# Patient Record
Sex: MALE | Race: WHITE
[De-identification: names, ages, dates, MRNs, and addresses within clinical notes are randomized per-mention and may not be internally consistent; named-entity substitution may affect disease eponyms.]

---

## 2019-01-18 ENCOUNTER — HOSPITAL ENCOUNTER (OUTPATIENT)
Dept: HOSPITAL 57 - BURULT | Age: 54
Discharge: HOME | End: 2019-01-18
Attending: FAMILY MEDICINE
Payer: COMMERCIAL

## 2019-01-18 DIAGNOSIS — R10.84: Primary | ICD-10-CM

## 2019-01-18 DIAGNOSIS — R93.3: ICD-10-CM

## 2019-01-18 PROCEDURE — 74177 CT ABD & PELVIS W/CONTRAST: CPT

## 2019-01-18 NOTE — CT
CT ABDOMEN AND PELVIS WITH CONTRAST

1/18/19

 

Spiral CT of the abdomen and pelvis was done for evaluation of generalized abdominal pain. Axial slic
es were acquired after giving oral and IV contrast. Then coronal and sagittal reconstructions were do
ne.

 

The lung bases are clear. 

 

The liver, spleen, pancreas, gallbladder, adrenal glands, kidneys, and abdominal aorta all shows no a
cute findings. Some calcification is seen in the lower abdominal aorta, but there is no aneurysm. 

 

The major finding on this study is very definite, prominent bowel wall thickening in the duodenum and
 proximal small bowel. It remains this way through about the mid small bowel then assumes a more norm
al appearance distally. Regarding the colon, there is some prominence of its folds but none that trul
y appear acute. There is no sign of diverticulitis. There was no genaro-intestinal stranding. No free a
ir or free fluid was seen. 

 

CT of the pelvis showed no masses, inflammatory changes in fat or free fluid. Small fat filled inguin
al hernias are seen bilaterally, largest on the right. 

 

IMPRESSION:  

Very prominent thickening of duodenum and proximal small bowel. Enteritis from infectious or inflamma
tory etiology seems most likely.

 

Code T

 

POS: HOME

## 2019-02-18 ENCOUNTER — HOSPITAL ENCOUNTER (OUTPATIENT)
Dept: HOSPITAL 92 - RAD | Age: 54
Discharge: HOME | End: 2019-02-18
Attending: INTERNAL MEDICINE
Payer: COMMERCIAL

## 2019-02-18 DIAGNOSIS — R06.00: Primary | ICD-10-CM

## 2019-02-18 DIAGNOSIS — I51.7: ICD-10-CM

## 2019-02-18 PROCEDURE — 82533 TOTAL CORTISOL: CPT

## 2019-02-18 PROCEDURE — 82607 VITAMIN B-12: CPT

## 2019-02-18 PROCEDURE — 36415 COLL VENOUS BLD VENIPUNCTURE: CPT

## 2019-02-18 PROCEDURE — 84403 ASSAY OF TOTAL TESTOSTERONE: CPT

## 2019-02-18 PROCEDURE — 71046 X-RAY EXAM CHEST 2 VIEWS: CPT

## 2019-02-18 NOTE — RAD
TWO VIEWS CHEST:

2/18/19

 

HISTORY: 

Dyspnea. 

 

PA and lateral views of the chest is obtained on 2/18/19.

 

COMPARISON:  

Comparison made to previous exam from 12/19/18.

 

Two views chest demonstrates mild cardiomegaly. Pulmonary vascular congestion seen. No evidence of ef
fusions, pneumonia or pneumothorax seen.

 

IMPRESSION:  

Mild cardiomegaly, otherwise unremarkable two views chest. 

 

POS: [facility]

## 2019-05-14 ENCOUNTER — HOSPITAL ENCOUNTER (OUTPATIENT)
Dept: HOSPITAL 92 - ERS | Age: 54
Setting detail: OBSERVATION
LOS: 1 days | Discharge: HOME | End: 2019-05-15
Attending: FAMILY MEDICINE | Admitting: FAMILY MEDICINE
Payer: COMMERCIAL

## 2019-05-14 VITALS — BODY MASS INDEX: 33.7 KG/M2

## 2019-05-14 DIAGNOSIS — I12.9: ICD-10-CM

## 2019-05-14 DIAGNOSIS — F32.9: ICD-10-CM

## 2019-05-14 DIAGNOSIS — E87.5: Primary | ICD-10-CM

## 2019-05-14 DIAGNOSIS — E78.5: ICD-10-CM

## 2019-05-14 DIAGNOSIS — F10.11: ICD-10-CM

## 2019-05-14 DIAGNOSIS — N18.3: ICD-10-CM

## 2019-05-14 DIAGNOSIS — N17.9: ICD-10-CM

## 2019-05-14 DIAGNOSIS — Z87.891: ICD-10-CM

## 2019-05-14 DIAGNOSIS — D63.1: ICD-10-CM

## 2019-05-14 DIAGNOSIS — F41.9: ICD-10-CM

## 2019-05-14 DIAGNOSIS — E87.1: ICD-10-CM

## 2019-05-14 DIAGNOSIS — Z79.52: ICD-10-CM

## 2019-05-14 DIAGNOSIS — E66.9: ICD-10-CM

## 2019-05-14 DIAGNOSIS — E87.2: ICD-10-CM

## 2019-05-14 DIAGNOSIS — Z79.899: ICD-10-CM

## 2019-05-14 DIAGNOSIS — K21.9: ICD-10-CM

## 2019-05-14 LAB
ALBUMIN SERPL BCG-MCNC: 4 G/DL (ref 3.5–5)
ALP SERPL-CCNC: 238 U/L (ref 40–150)
ALT SERPL W P-5'-P-CCNC: 40 U/L (ref 8–55)
ANION GAP SERPL CALC-SCNC: 13 MMOL/L (ref 10–20)
AST SERPL-CCNC: 34 U/L (ref 5–34)
BASOPHILS # BLD AUTO: 0 THOU/UL (ref 0–0.2)
BASOPHILS NFR BLD AUTO: 0.2 % (ref 0–1)
BILIRUB SERPL-MCNC: 0.6 MG/DL (ref 0.2–1.2)
BUN SERPL-MCNC: 46 MG/DL (ref 8.4–25.7)
CALCIUM SERPL-MCNC: 9.7 MG/DL (ref 7.8–10.44)
CHLORIDE SERPL-SCNC: 111 MMOL/L (ref 98–107)
CO2 SERPL-SCNC: 16 MMOL/L (ref 22–29)
CREAT CL PREDICTED SERPL C-G-VRATE: 0 ML/MIN (ref 70–130)
EOSINOPHIL # BLD AUTO: 0 THOU/UL (ref 0–0.7)
EOSINOPHIL NFR BLD AUTO: 0.3 % (ref 0–10)
GLOBULIN SER CALC-MCNC: 4.3 G/DL (ref 2.4–3.5)
GLUCOSE SERPL-MCNC: 180 MG/DL (ref 70–105)
HGB BLD-MCNC: 13.5 G/DL (ref 14–18)
LYMPHOCYTES # BLD: 1.7 THOU/UL (ref 1.2–3.4)
LYMPHOCYTES NFR BLD AUTO: 20.4 % (ref 21–51)
MCH RBC QN AUTO: 30.4 PG (ref 27–31)
MCV RBC AUTO: 89.2 FL (ref 78–98)
MONOCYTES # BLD AUTO: 0.5 THOU/UL (ref 0.11–0.59)
MONOCYTES NFR BLD AUTO: 5.7 % (ref 0–10)
NEUTROPHILS # BLD AUTO: 5.9 THOU/UL (ref 1.4–6.5)
NEUTROPHILS NFR BLD AUTO: 73.4 % (ref 42–75)
PLATELET # BLD AUTO: 269 THOU/UL (ref 130–400)
POTASSIUM SERPL-SCNC: 5.3 MMOL/L (ref 3.5–5.1)
POTASSIUM SERPL-SCNC: 5.3 MMOL/L (ref 3.5–5.1)
RBC # BLD AUTO: 4.43 MILL/UL (ref 4.7–6.1)
SODIUM SERPL-SCNC: 135 MMOL/L (ref 136–145)
WBC # BLD AUTO: 8.1 THOU/UL (ref 4.8–10.8)

## 2019-05-14 PROCEDURE — G0378 HOSPITAL OBSERVATION PER HR: HCPCS

## 2019-05-14 PROCEDURE — 96365 THER/PROPH/DIAG IV INF INIT: CPT

## 2019-05-14 PROCEDURE — 76770 US EXAM ABDO BACK WALL COMP: CPT

## 2019-05-14 PROCEDURE — 96366 THER/PROPH/DIAG IV INF ADDON: CPT

## 2019-05-14 PROCEDURE — 36415 COLL VENOUS BLD VENIPUNCTURE: CPT

## 2019-05-14 PROCEDURE — 96375 TX/PRO/DX INJ NEW DRUG ADDON: CPT

## 2019-05-14 PROCEDURE — 80053 COMPREHEN METABOLIC PANEL: CPT

## 2019-05-14 PROCEDURE — 85025 COMPLETE CBC W/AUTO DIFF WBC: CPT

## 2019-05-14 PROCEDURE — 96361 HYDRATE IV INFUSION ADD-ON: CPT

## 2019-05-14 PROCEDURE — 93005 ELECTROCARDIOGRAM TRACING: CPT

## 2019-05-14 RX ADMIN — HEPARIN SODIUM SCH UNITS: 5000 INJECTION, SOLUTION INTRAVENOUS; SUBCUTANEOUS at 21:55

## 2019-05-15 VITALS — SYSTOLIC BLOOD PRESSURE: 139 MMHG | DIASTOLIC BLOOD PRESSURE: 74 MMHG

## 2019-05-15 VITALS — TEMPERATURE: 98.3 F

## 2019-05-15 LAB
ANION GAP SERPL CALC-SCNC: 14 MMOL/L (ref 10–20)
BASOPHILS # BLD AUTO: 0.1 THOU/UL (ref 0–0.2)
BASOPHILS NFR BLD AUTO: 0.9 % (ref 0–1)
BUN SERPL-MCNC: 44 MG/DL (ref 8.4–25.7)
CALCIUM SERPL-MCNC: 9.3 MG/DL (ref 7.8–10.44)
CHLORIDE SERPL-SCNC: 109 MMOL/L (ref 98–107)
CO2 SERPL-SCNC: 20 MMOL/L (ref 22–29)
CREAT CL PREDICTED SERPL C-G-VRATE: 59 ML/MIN (ref 70–130)
EOSINOPHIL # BLD AUTO: 0.2 THOU/UL (ref 0–0.7)
EOSINOPHIL NFR BLD AUTO: 1.8 % (ref 0–10)
GLUCOSE SERPL-MCNC: 103 MG/DL (ref 70–105)
HGB BLD-MCNC: 12.4 G/DL (ref 14–18)
LYMPHOCYTES # BLD: 3.3 THOU/UL (ref 1.2–3.4)
LYMPHOCYTES NFR BLD AUTO: 32.3 % (ref 21–51)
MCH RBC QN AUTO: 30 PG (ref 27–31)
MCV RBC AUTO: 90.3 FL (ref 78–98)
MONOCYTES # BLD AUTO: 1.1 THOU/UL (ref 0.11–0.59)
MONOCYTES NFR BLD AUTO: 10.2 % (ref 0–10)
NEUTROPHILS # BLD AUTO: 5.7 THOU/UL (ref 1.4–6.5)
NEUTROPHILS NFR BLD AUTO: 54.8 % (ref 42–75)
PLATELET # BLD AUTO: 242 THOU/UL (ref 130–400)
POTASSIUM SERPL-SCNC: 4.5 MMOL/L (ref 3.5–5.1)
RBC # BLD AUTO: 4.14 MILL/UL (ref 4.7–6.1)
SODIUM SERPL-SCNC: 138 MMOL/L (ref 136–145)
WBC # BLD AUTO: 10.3 THOU/UL (ref 4.8–10.8)

## 2019-05-15 RX ADMIN — HEPARIN SODIUM SCH UNITS: 5000 INJECTION, SOLUTION INTRAVENOUS; SUBCUTANEOUS at 08:26

## 2019-06-24 ENCOUNTER — HOSPITAL ENCOUNTER (OUTPATIENT)
Dept: HOSPITAL 92 - NM | Age: 54
Discharge: HOME | End: 2019-06-24
Attending: INTERNAL MEDICINE
Payer: COMMERCIAL

## 2019-06-24 DIAGNOSIS — R06.09: Primary | ICD-10-CM

## 2019-06-24 PROCEDURE — 93017 CV STRESS TEST TRACING ONLY: CPT

## 2019-06-24 PROCEDURE — A9500 TC99M SESTAMIBI: HCPCS

## 2019-06-24 PROCEDURE — 78452 HT MUSCLE IMAGE SPECT MULT: CPT

## 2019-07-01 ENCOUNTER — HOSPITAL ENCOUNTER (OUTPATIENT)
Dept: HOSPITAL 92 - ULT | Age: 54
Discharge: HOME | End: 2019-07-01
Attending: INTERNAL MEDICINE
Payer: COMMERCIAL

## 2019-07-01 DIAGNOSIS — R06.09: Primary | ICD-10-CM

## 2019-07-01 DIAGNOSIS — I08.3: ICD-10-CM

## 2019-07-01 PROCEDURE — 93306 TTE W/DOPPLER COMPLETE: CPT
